# Patient Record
(demographics unavailable — no encounter records)

---

## 2024-11-18 NOTE — PLAN
[FreeTextEntry1] : Ms. XU METZGER 54 year female with a PMH Hyperlipidemia, IBS, vitamin D deficiency present to the office  due to a  sickness Recommend  to take medications as it is prescribed. If symptoms will persist RTC. Rx were issued. For a nasal congestion, recommend to use nasal saline drops

## 2024-11-18 NOTE — HISTORY OF PRESENT ILLNESS
[FreeTextEntry8] : Ms. XU METZGER 54 year female with a PMH Hyperlipidemia, IBS, vitamin D deficiency present to the office  due to a  sickness. As per patient  for the past 1 wk feels sick. Has a cough with yellow phlegm production, nasal congestion. Initially symptoms started with fever for couple days, was feeling fatigue. Did a COVID test at home  twice was negative. Currently has a cough and nasal congestion

## 2024-11-18 NOTE — PHYSICAL EXAM
[TextEntry] : Constitutional: Well developed, well appearing, not in acute distress Head: Normocephalic, no lesions Eyes: PERRLA, conjunctiva is NL Ear: Ear canal is normal, tympanic membrane is intact Nose: Nasal turbinates are swollen Throat: Clear, no exudates, no lesions Neck: Supple, no masses Chest: Lungs are clear, no rales, no rhonchi, no wheezing Heart: Regular rate, no murmurs, no rubs, no gallops Abdomen: Soft, no tenderness, no masses, bowel sounds are normal : No CVAT Neuro: AAO x 3, no focal neurological deficit.

## 2024-11-18 NOTE — REVIEW OF SYSTEMS
[TextEntry] : Constitutional: Denies  fever Head: Denies headache Eyes:  Denies  tearing or pain Ears: Denies  earache Nose: C/o nasal congestion Throat: Denies throat discomfort Neck: Denies stiffness, muscle tenderness Chest: C/o cough, chest congestion. Denies SOB, wheezing CV: Denies chest pain, palpitation GI: Denies abdominal pain Genitourinary: Denies dysuria, urinary urgency

## 2024-12-26 NOTE — PHYSICAL EXAM
[de-identified] : PLEASE SEE HPI FOR ADDITIONAL RELEVANT PHYSICAL EXAM FINDINGS GENERAL: no acute distress, nontoxic HEAD: normocephalic EYES: no scleral icterus NECK: trachea midline, Full ROM RESP: no respiratory distress ABD: nondistended MSK: no gross deformity NEURO: alert & fully oriented SKIN: no rash PSYCH: cooperative, good insight, appropriate, fluent speech

## 2024-12-26 NOTE — ASSESSMENT
[FreeTextEntry1] : URI, continued cough and chest congestion >7 days of symptoms R/B/A of starting abx discussed with

## 2024-12-26 NOTE — HISTORY OF PRESENT ILLNESS
[Other Location: e.g. School (Enter Location, City,State)___] : at [unfilled], at the time of the visit. [Other Location: e.g. Home (Enter Location, City,State)___] : at [unfilled] [Verbal consent obtained from patient] : the patient, [unfilled] [FreeTextEntry8] : 54 y F c HLD, IBS, seen recently for URI symptoms x several days, Rx'd albuterol and nasal spray now with improving symptoms but +cough that had continued.  Inhaler has been helping but still has chest congestion.  No SOB, chest pain, n/v.  No f/c.  No exercise intolerance.  Had mild HA today that improved c APAP.  Taking DM containing OTC med without improvement

## 2025-07-09 NOTE — HISTORY OF PRESENT ILLNESS
[postmenopausal] : postmenopausal [FreeTextEntry1] : 55 year old P0 postmenopausal presents for annual gyn visit. PMHx of uterine fibroids. She denies PMB, itching, burning, and abnormal discharge. She complains of bloating. Pt offers no complaints regarding bowel movement or urination. [N] : Patient is not sexually active [Mammogramdate] : 05/25 [BreastSonogramDate] : 05/25 [PapSmeardate] : 06/24 [BoneDensityDate] : 06/23 [ColonoscopyDate] : 2020

## 2025-07-09 NOTE — SIGNATURES
[TextEntry] :  This note was written by Niecy Velarde on 07/01/2025 actively solely Helen Monroy M.D.     All medical record entries made by this scribe at my, Helen Monroy M.D direction and personally dictated by me on 07/01/2025. I have personally reviewed the chart and agree that the record reflects my personal performance of the history, physical exam, assessment, and plan.

## 2025-07-09 NOTE — PHYSICAL EXAM
[MA] : MA [Appropriately responsive] : appropriately responsive [Alert] : alert [No Acute Distress] : no acute distress [No Lymphadenopathy] : no lymphadenopathy [Soft] : soft [Non-tender] : non-tender [Non-distended] : non-distended [No HSM] : No HSM [No Lesions] : no lesions [No Mass] : no mass [Oriented x3] : oriented x3 [Examination Of The Breasts] : a normal appearance [No Masses] : no breast masses were palpable [Labia Majora] : normal [Labia Minora] : normal [Normal] : normal [Uterine Adnexae] : normal [Chaperoned Physical Exam] : A chaperone was present in the examining room during all aspects of the physical examination. [FreeTextEntry2] : Shruti [FreeTextEntry1] : Shruti [Tenderness] : nontender [Enlarged ___ wks] : enlarged [unfilled] ~Uweeks